# Patient Record
Sex: MALE | Race: WHITE | NOT HISPANIC OR LATINO | Employment: UNEMPLOYED | URBAN - METROPOLITAN AREA
[De-identification: names, ages, dates, MRNs, and addresses within clinical notes are randomized per-mention and may not be internally consistent; named-entity substitution may affect disease eponyms.]

---

## 2017-12-24 ENCOUNTER — HOSPITAL ENCOUNTER (EMERGENCY)
Facility: HOSPITAL | Age: 27
Discharge: HOME | End: 2017-12-24
Attending: EMERGENCY MEDICINE | Admitting: EMERGENCY MEDICINE
Payer: COMMERCIAL

## 2017-12-24 VITALS
OXYGEN SATURATION: 97 % | HEART RATE: 96 BPM | DIASTOLIC BLOOD PRESSURE: 98 MMHG | TEMPERATURE: 97.9 F | RESPIRATION RATE: 20 BRPM | SYSTOLIC BLOOD PRESSURE: 158 MMHG

## 2017-12-24 DIAGNOSIS — L03.90 CELLULITIS: ICD-10-CM

## 2017-12-24 DIAGNOSIS — F10.929 ALCOHOL INTOXICATION (HCC): Primary | ICD-10-CM

## 2017-12-24 LAB
AMPHETAMINES SERPL QL SCN: NEGATIVE
BARBITURATES UR QL: NEGATIVE
BENZODIAZ UR QL: NEGATIVE
COCAINE UR QL: NEGATIVE
ETHANOL EXG-MCNC: 0.08 MG/DL
ETHANOL EXG-MCNC: 0.12 MG/DL
ETHANOL EXG-MCNC: 0.14 MG/DL
ETHANOL EXG-MCNC: 0.32 MG/DL
METHADONE UR QL: NEGATIVE
OPIATES UR QL SCN: NEGATIVE
PCP UR QL: NEGATIVE
THC UR QL: NEGATIVE

## 2017-12-24 PROCEDURE — 80307 DRUG TEST PRSMV CHEM ANLYZR: CPT | Performed by: EMERGENCY MEDICINE

## 2017-12-24 PROCEDURE — 82075 ASSAY OF BREATH ETHANOL: CPT | Performed by: EMERGENCY MEDICINE

## 2017-12-24 PROCEDURE — 90715 TDAP VACCINE 7 YRS/> IM: CPT | Performed by: EMERGENCY MEDICINE

## 2017-12-24 PROCEDURE — 99284 EMERGENCY DEPT VISIT MOD MDM: CPT

## 2017-12-24 PROCEDURE — 90471 IMMUNIZATION ADMIN: CPT

## 2017-12-24 RX ORDER — CLINDAMYCIN HYDROCHLORIDE 150 MG/1
450 CAPSULE ORAL EVERY 8 HOURS SCHEDULED
Qty: 90 CAPSULE | Refills: 0 | Status: SHIPPED | OUTPATIENT
Start: 2017-12-24 | End: 2018-01-03

## 2017-12-24 RX ORDER — CLINDAMYCIN HYDROCHLORIDE 150 MG/1
450 CAPSULE ORAL ONCE
Status: COMPLETED | OUTPATIENT
Start: 2017-12-24 | End: 2017-12-24

## 2017-12-24 RX ORDER — LEVOTHYROXINE SODIUM 0.05 MG/1
1 TABLET ORAL DAILY
COMMUNITY
End: 2017-12-24 | Stop reason: ALTCHOICE

## 2017-12-24 RX ORDER — CLINDAMYCIN HYDROCHLORIDE 150 MG/1
450 CAPSULE ORAL EVERY 8 HOURS SCHEDULED
Status: DISCONTINUED | OUTPATIENT
Start: 2017-12-24 | End: 2017-12-24 | Stop reason: HOSPADM

## 2017-12-24 RX ORDER — DIPHENHYDRAMINE HCL 25 MG
25 TABLET ORAL ONCE
Status: COMPLETED | OUTPATIENT
Start: 2017-12-24 | End: 2017-12-24

## 2017-12-24 RX ADMIN — CLINDAMYCIN HYDROCHLORIDE 450 MG: 150 CAPSULE ORAL at 16:08

## 2017-12-24 RX ADMIN — TETANUS TOXOID, REDUCED DIPHTHERIA TOXOID AND ACELLULAR PERTUSSIS VACCINE, ADSORBED 0.5 ML: 5; 2.5; 8; 8; 2.5 SUSPENSION INTRAMUSCULAR at 08:19

## 2017-12-24 RX ADMIN — DIPHENHYDRAMINE HCL 25 MG: 25 TABLET ORAL at 14:23

## 2017-12-24 RX ADMIN — CLINDAMYCIN HYDROCHLORIDE 450 MG: 150 CAPSULE ORAL at 08:19

## 2017-12-24 NOTE — ED NOTES
Pt changed into blue paper scrubs  [place] and remote control given        Wyatt Richardson RN  12/24/17 8411

## 2017-12-24 NOTE — ED NOTES
Patient reports that he is not taking any medications at this time    States that he was on synthroid, 50mcg but "I was told I don't need it anymore"     Rena Nichole RN  12/24/17 3375

## 2017-12-24 NOTE — ED NOTES
Pt offered something to eat or drink, pt agreeable to beverage, water provided as per pt's request       Billy Aquino RN  12/24/17 5782

## 2017-12-24 NOTE — ED NOTES
Dutch from crisis called back states will be doing consult over phone, phone handed pt       Tete Grace RN  12/24/17 9484

## 2017-12-24 NOTE — ED NOTES
Redness noted to right forearm  Pt states he noted yesterday  Some insect unsure bit him  Redness, warm to touch on right anterior and right posterior arm  No drainage noted  Dr Patrice Aquino made aware        Kristen Conti RN  12/24/17 1243

## 2017-12-24 NOTE — ED NOTES
Patient states "my arms are itchy from all of the bug bites, can I have something for that"  Dr Prisca Gerber made aware       Anushka Maher RN  12/24/17 7585

## 2017-12-24 NOTE — ED NOTES
Pt came into the ED drunk  The CW spoke to the pt once he was legally sober over the phone to complete the intake  The pt denies SI/Hi/AH/Vh  The pt stated that he drank because he got bad news from his ex-girlfriend  His ex-girlfriend told him that she was going out with a new man  This made the pt feel really bad about himself  The pt drank a bottle of Vodka and some beers  The ED staff noted that the pt stated that he was SI without a plan last night while the pt was very drunk  The pt states that he doesn't remember what he might have said last night because he was so drunk  The pt stated that he isn't SI and if he said that he was last night it was only said that because he was so drunk  The pt did some superficial cutting as a way to cope with this  The pt stated that he has been using cutting as a way to cope with negative things since he was in middle school  It makes him feel better  The pt stated that the cutting was not him trying to kill himself  The pt did stated that being alone during the holidays make him depressed  The pt stated that if DC he would stay with his mother  His mother is a good support for him  The pt stated that he wouldn't hurt himself or others if DC to home  The pt is in agreement to come back to ED if he feels that he will others or himself  The pt will DC with OP  and support group list  Dr Jermain Calderón in agreement with DC and DC plan

## 2017-12-24 NOTE — ED NOTES
Patient ate 100% of lunch tray  Patient laid on stretcher and appears to be going to sleep       Kevin Bustamante RN  12/24/17 4566

## 2017-12-24 NOTE — ED PROVIDER NOTES
History  Chief Complaint   Patient presents with    Psychiatric Evaluation     per report pt's ex-girlfriend told him she was dating someone else  pt got really upset  cut b/l arms hx self harm  pt started to drink vodka since 7p last night  pt admits SI denies HI/AH/VH,        History provided by:  Patient   used: No    Psychiatric Evaluation   Presenting symptoms: self-mutilation and suicidal thoughts    Presenting symptoms: no agitation, no hallucinations and no homicidal ideas    Degree of incapacity (severity): Moderate  Onset quality:  Gradual  Duration:  1 day  Timing:  Intermittent  Progression:  Waxing and waning  Chronicity:  Recurrent  Context: alcohol use and stressful life event    Context comment:  Ex-Girlfriend starting dating somebody else  Treatment compliance:  Untreated  Relieved by:  Nothing  Worsened by:  Nothing  Ineffective treatments:  None tried  Associated symptoms: no abdominal pain, no chest pain and no headaches    Risk factors: hx of mental illness        None       Past Medical History:   Diagnosis Date    Psychiatric disorder        History reviewed  No pertinent surgical history  History reviewed  No pertinent family history  I have reviewed and agree with the history as documented  Social History   Substance Use Topics    Smoking status: Former Smoker     Quit date: 10/24/2017    Smokeless tobacco: Never Used    Alcohol use Yes        Review of Systems   Constitutional: Negative for activity change, chills and fever  HENT: Negative for facial swelling, sore throat and trouble swallowing  Eyes: Negative for pain and visual disturbance  Respiratory: Negative for cough, chest tightness and shortness of breath  Cardiovascular: Negative for chest pain and leg swelling  Gastrointestinal: Negative for abdominal pain, blood in stool, diarrhea, nausea and vomiting  Genitourinary: Negative for dysuria and flank pain     Musculoskeletal: Negative for back pain, neck pain and neck stiffness  Skin: Negative for pallor and rash  Allergic/Immunologic: Negative for environmental allergies and immunocompromised state  Neurological: Negative for dizziness and headaches  Hematological: Negative for adenopathy  Does not bruise/bleed easily  Psychiatric/Behavioral: Positive for self-injury and suicidal ideas  Negative for agitation, behavioral problems, hallucinations and homicidal ideas  All other systems reviewed and are negative  Physical Exam  ED Triage Vitals [12/24/17 0745]   Temperature Pulse Respirations Blood Pressure SpO2   98 3 °F (36 8 °C) 103 18 (!) 168/106 97 %      Temp Source Heart Rate Source Patient Position - Orthostatic VS BP Location FiO2 (%)   Oral Monitor Lying Left arm --      Pain Score       No Pain           Orthostatic Vital Signs  Vitals:    12/24/17 0745 12/24/17 0811 12/24/17 1558 12/24/17 1710   BP: (!) 168/106 (!) 168/106 160/98 158/98   Pulse: 103 103 99 96   Patient Position - Orthostatic VS: Lying  Sitting Sitting       Physical Exam   Constitutional: He is oriented to person, place, and time  He appears well-developed and well-nourished  No distress  HENT:   Head: Normocephalic and atraumatic  Eyes: EOM are normal    Neck: Normal range of motion  Neck supple  Cardiovascular: Normal rate, regular rhythm, normal heart sounds and intact distal pulses  Pulmonary/Chest: Effort normal and breath sounds normal    Abdominal: Soft  Bowel sounds are normal  There is no tenderness  There is no rebound and no guarding  Musculoskeletal: Normal range of motion  Multiple superficial abrasions from superficial cut wounds blood by patient himself using knife, no gaping laceration   Neurological: He is alert and oriented to person, place, and time  Skin: Skin is warm and dry  There is erythema     Erythema involving 2 areas of right forearm, on flexor and dorsal aspect, small bite wounds seen, patient states that they are either mosquito or spider bites, surrounding cellulitis, no fluctuance   Psychiatric: He has a normal mood and affect  His speech is normal and behavior is normal  He expresses no homicidal ideation  He expresses no suicidal plans  Patient intoxicated, vaguely referred on arrival about SI, no plan; does have self inflicted cut wounds over bilateral arms    Nursing note and vitals reviewed  ED Medications  Medications   tetanus-diphtheria-acellular pertussis (BOOSTRIX) IM injection 0 5 mL (0 5 mL Intramuscular Given 12/24/17 0819)   clindamycin (CLEOCIN) capsule 450 mg (450 mg Oral Given 12/24/17 0819)   diphenhydrAMINE (BENADRYL) tablet 25 mg (25 mg Oral Given 12/24/17 1423)       Diagnostic Studies  Results Reviewed     Procedure Component Value Units Date/Time    POCT alcohol breath test [[id number]]  (Normal) Resulted:  12/24/17 1611    Lab Status:  Final result Updated:  12/24/17 1611     EXTBreath Alcohol 0 081    POCT alcohol breath test [[id number]]  (Normal) Resulted:  12/24/17 1455    Lab Status:  Final result Updated:  12/24/17 1455     EXTBreath Alcohol 0 120    POCT alcohol breath test [[id number]]  (Normal) Resulted:  12/24/17 1349    Lab Status:  Final result Updated:  12/24/17 1349     EXTBreath Alcohol 0 142    Rapid drug screen, urine [[id number]]  (Normal) Collected:  12/24/17 0753    Lab Status:  Final result Specimen:  Urine from Urine, Clean Catch Updated:  12/24/17 0812     Amph/Meth UR Negative     Barbiturate Ur Negative     Benzodiazepine Urine Negative     Cocaine Urine Negative     Methadone Urine Negative     Opiate Urine Negative     PCP Ur Negative     THC Urine Negative    Narrative:         FOR MEDICAL PURPOSES ONLY  IF CONFIRMATION NEEDED PLEASE CONTACT THE LAB WITHIN 5 DAYS      Drug Screen Cutoff Levels:  AMPHETAMINE/METHAMPHETAMINES  1000 ng/mL  BARBITURATES     200 ng/mL  BENZODIAZEPINES     200 ng/mL  COCAINE      300 ng/mL  METHADONE      300 ng/mL  OPIATES      300 ng/mL  PHENCYCLIDINE     25 ng/mL  THC       50 ng/mL    POCT alcohol breath test [[id number]]  (Abnormal) Resulted:  12/24/17 0748    Lab Status:  Edited Result - FINAL Updated:  12/24/17 0748     EXTBreath Alcohol 0 317                 No orders to display              Procedures  Procedures       Phone Contacts  ED Phone Contact    ED Course  ED Course as of Dec 24 2109   Sun Dec 24, 2017   1407 Patient given Clindamycin for right arm Cellulitis, Benadryl for itching at point of ?insect bite lalito  16:30  Case discussed with Dr Steffani Blizzard, patient waiting Crisis Evaluation, dispo as per Crisis evaluation  MDM  Number of Diagnoses or Management Options  [facility] ): new and requires workup  Cellulitis: new and does not require workup  Diagnosis management comments: Patient is a 24-year-old male, comes in with history of self-harm after he found out that his ex-girlfriend stating somebody else, started drinking alcohol yesterday, cut himself on multiple places involving the upper extremities, using knife, superficial cuts, no deep laceration, no gaping wound; also reports spider/mosquito bites in right forearm, with tiny bite marks and surrounding erythema suggestive of cellulitis on flexor and dorsal aspect of the right forearm, no fluctuance to suggest abscess  Impression:  Alcohol Intoxication, self-harm, multiple CT bones of the upper extremities, cellulitis of right forearm reportedly after a spider/muscular bite  Alcohol 0 317, we will get crisis evaluation after patient is over, update tetanus, give clindamycin for cellulitis as patient is allergic to penicillin/amoxil         Amount and/or Complexity of Data Reviewed  Clinical lab tests: reviewed      CritCare Time    Disposition  Final diagnoses:   Alcohol intoxication ([facility] [address] )   Cellulitis     Time reflects when diagnosis was documented in both MDM as applicable and the Disposition within this note     Time User Action Codes Description Comment    12/24/2017  8:05 AM Danilo Guevara Add [F10 929] Alcohol intoxication ([address] )     12/24/2017  8:05 AM Danilo Guevara Add [L03 90] Cellulitis       ED Disposition     ED Disposition Condition Comment    Discharge  [facility] discharge to home/self care  Condition at discharge: Good        MD Documentation    Cristiana Gray Most Recent Value   Sending MD Dr Anahi Burger    None       Discharge Medication List as of 12/24/2017  4:52 PM      START taking these medications    Details   clindamycin (CLEOCIN) 150 mg capsule Take 3 capsules by mouth every 8 (eight) hours for 10 days, Starting Sun 12/24/2017, Until Wed 1/3/2018, Print           No discharge procedures on file      ED Provider  Electronically Signed by           Cherry He MD  12/24/17 5975

## 2017-12-24 NOTE — DISCHARGE INSTRUCTIONS
Cellulitis   WHAT YOU NEED TO KNOW:   Cellulitis is a skin infection caused by bacteria  Cellulitis may go away on its own or you may need treatment  Your healthcare provider may draw a [place] around the outside edges of your cellulitis  If your cellulitis spreads, your healthcare provider will see it outside of the [place]  DISCHARGE INSTRUCTIONS:   Call 911 if:   · You have sudden trouble breathing or chest pain  Return to the emergency department if:   · Your wound gets larger and more painful  · You feel a crackling under your skin when you touch it  · You have purple dots or bumps on your skin, or you see bleeding under your skin  · You have new swelling and pain in your legs  · The red, warm, swollen area gets larger  · You see red streaks coming from the infected area  Contact your healthcare provider if:   · You have a fever  · Your fever or pain does not go away or gets worse  · The area does not get smaller after 2 days of antibiotics  · Your skin is flaking or peeling off  · You have questions or concerns about your condition or care  Medicines:   · Antibiotics  help treat the bacterial infection  · NSAIDs , such as ibuprofen, help decrease swelling, pain, and fever  NSAIDs can cause stomach bleeding or kidney problems in certain people  If you take blood thinner medicine, always ask if NSAIDs are safe for you  Always read the medicine label and follow directions  Do not give these medicines to children under 10months of age without direction from your child's healthcare provider  · Acetaminophen  decreases pain and fever  It is available without a doctor's order  Ask how much to take and how often to take it  Follow directions  Read the labels of all other medicines you are using to see if they also contain acetaminophen, or ask your doctor or pharmacist  Acetaminophen can cause liver damage if not taken correctly   Do not use more than 4 grams (4,000 milligrams) total of acetaminophen in one day  · Take your medicine as directed  Contact your healthcare provider if you think your medicine is not helping or if you have side effects  Tell him or her if you are allergic to any medicine  Keep a list of the medicines, vitamins, and herbs you take  Include the amounts, and when and why you take them  Bring the list or the pill bottles to follow-up visits  Carry your medicine list with you in case of an emergency  Self-care:   · Elevate the area above the level of your heart  as often as you can  This will help decrease swelling and pain  Prop the area on pillows or blankets to keep it elevated comfortably  · Clean the area daily until the wound scabs over  Gently wash the area with soap and water  Pat dry  Use dressings as directed  · Place cool or warm, wet cloths on the area as directed  Use clean cloths and clean water  Leave it on the area until the cloth is room temperature  Pat the area dry with a clean, dry cloth  The cloths may help decrease pain  Prevent cellulitis:   · Do not scratch bug bites or areas of injury  You increase your risk for cellulitis by scratching these areas  · Do not share personal items, such as towels, clothing, and razors  · Clean exercise equipment  with germ-killing  before and after you use it  · Wash your hands often  Use soap and water  Wash your hands after you use the bathroom, change a child's diapers, or sneeze  Wash your hands before you prepare or eat food  Use lotion to prevent dry, cracked skin  · Wear pressure stockings as directed  You may be told to wear the stockings if you have peripheral edema  The stockings improve blood flow and decrease swelling  · Treat athlete's foot  This can help prevent the spread of a bacterial skin infection  Follow up with your healthcare provider within 3 days, or as directed:   Your healthcare provider will check if your cellulitis is getting better  You may need different medicine  Write down your questions so you remember to ask them during your visits  © 2017 [address] [employer] Information is for End User's use only and may not be sold, redistributed or otherwise used for commercial purposes  All illustrations and images included in CareNotes® are the copyrighted property of A [employer]  or [employer]  The above information is an  only  It is not intended as medical advice for individual conditions or treatments  Talk to your doctor, nurse or pharmacist before following any medical regimen to see if it is safe and effective for you